# Patient Record
Sex: MALE | Race: BLACK OR AFRICAN AMERICAN | NOT HISPANIC OR LATINO | Employment: OTHER | URBAN - METROPOLITAN AREA
[De-identification: names, ages, dates, MRNs, and addresses within clinical notes are randomized per-mention and may not be internally consistent; named-entity substitution may affect disease eponyms.]

---

## 2024-03-05 ENCOUNTER — OFFICE VISIT (OUTPATIENT)
Dept: GASTROENTEROLOGY | Facility: HOSPITAL | Age: 64
End: 2024-03-05
Payer: COMMERCIAL

## 2024-03-05 VITALS
HEART RATE: 98 BPM | HEIGHT: 69 IN | DIASTOLIC BLOOD PRESSURE: 88 MMHG | SYSTOLIC BLOOD PRESSURE: 165 MMHG | BODY MASS INDEX: 21.03 KG/M2 | OXYGEN SATURATION: 98 % | WEIGHT: 142 LBS | RESPIRATION RATE: 18 BRPM | TEMPERATURE: 97.7 F

## 2024-03-05 DIAGNOSIS — R63.4 ABNORMAL WEIGHT LOSS: ICD-10-CM

## 2024-03-05 DIAGNOSIS — R10.13 EPIGASTRIC PAIN: Primary | ICD-10-CM

## 2024-03-05 DIAGNOSIS — R11.2 NAUSEA AND VOMITING, UNSPECIFIED VOMITING TYPE: ICD-10-CM

## 2024-03-05 DIAGNOSIS — R12 BURNING REFLUX: ICD-10-CM

## 2024-03-05 DIAGNOSIS — Q45.3 ABNORMALITY OF PANCREATIC DUCT: ICD-10-CM

## 2024-03-05 PROCEDURE — 1036F TOBACCO NON-USER: CPT | Performed by: NURSE PRACTITIONER

## 2024-03-05 PROCEDURE — 99204 OFFICE O/P NEW MOD 45 MIN: CPT | Performed by: NURSE PRACTITIONER

## 2024-03-05 PROCEDURE — 99214 OFFICE O/P EST MOD 30 MIN: CPT | Performed by: NURSE PRACTITIONER

## 2024-03-05 RX ORDER — TAMSULOSIN HYDROCHLORIDE 0.4 MG/1
0.4 CAPSULE ORAL
COMMUNITY
Start: 2024-03-03

## 2024-03-05 RX ORDER — OMEPRAZOLE 40 MG/1
40 CAPSULE, DELAYED RELEASE ORAL 2 TIMES DAILY
Qty: 60 CAPSULE | Refills: 11 | Status: SHIPPED | OUTPATIENT
Start: 2024-03-05 | End: 2025-03-05

## 2024-03-05 ASSESSMENT — PAIN SCALES - GENERAL: PAINLEVEL: 5

## 2024-03-05 NOTE — PROGRESS NOTES
Waylon TAVERAS is a 63 y.o. male with history of CAD and non-insulin-dependent diabetes who presents today for abdominal pain. He reports a 1-2 week history of epigastric abdominal pain, poor appetite, and indigestion. He has constant epigastric pain, nausea, and numerous episodes of vomiting. He now has odynophagia. He wants to eat but feels like he can't. The last few months he has lost 20-30 pounds.  He went to [facility] 3/2/2024 because the pain became unbearable and he had been unable to eat for a few days. CT scan showed mild prominence of the main pancreatic duct and intrapancreatic CBD which gradually tapered towards the ampulla. No radiopaque filling defect and no mass. His AST was slightly elevated at 55 but bili, alk phos, and ALT normal. Was not discharged on any medication.    The past year or 2 he has tended towards looser stools. It has progressed and he now has 5-10 loose stools per day. Imodium helps some. No rectal bleeding, hematochezia, or melena. He changed his diet but this did not help. He does not take NSAIDS regularly. No prior EGD or colonoscopy. Surgical history includes foot surgery.    Social history: No tobacco. Prior alcohol use. Denies illicit drugs.    Family history: Denies family history of ulcers, colon cancer, or other GI disorders or malignancy. Diabetes in the family.     History reviewed. No pertinent past medical history.    History reviewed. No pertinent surgical history.    Current Outpatient Medications   Medication Sig Dispense Refill    tamsulosin (Flomax) 0.4 mg 24 hr capsule Take 1 capsule (0.4 mg) by mouth. AT BEDTIME      omeprazole (PriLOSEC) 40 mg DR capsule Take 1 capsule (40 mg) by mouth 2 times a day. Do not crush or chew. 60 capsule 11     No current facility-administered medications for this visit.     No Known Allergies    No family history on file.    Review of Systems  Review of Systems negative except as noted in HPI.    Objective     /88   " Pulse 98   Temp 36.5 °C (97.7 °F)   Resp 18   Ht 1.753 m (5' 9\")   Wt 64.4 kg (142 lb)   SpO2 98%   BMI 20.97 kg/m²      Physical Exam  Constitutional:  No acute distress. Normal appearance. Chronically ill-appearing.  HENT:  Head normocephalic and atraumatic. Conjunctivae normal.  Cardiovascular:  Normal rate. Regular rhythm.  Pulmonary:  Pulmonary effort normal. No respiratory distress. Breath sounds clear.  Abdominal:  Abdomen is flat and soft. There is no distension. Epigastric tenderness with palpation. No rebound or guarding.  Skin: Dry.  Neurological:  Alert and oriented.  Psychiatric:  Mood and affect normal.    Assessment/Plan     63 y.o. male with history of CAD and non-insulin-dependent diabetes who presents today for initial clinic visit for several week history of epigastric pain with uncontrolled reflux, nausea, vomiting, and abnormal weight loss over 30 pounds. Recent CT at [facility] shows mild prominence of the main pancreatic duct and intrapancreatic CBD which gradually tapered towards the ampulla. No radiopaque filling defect and no mass. His AST was slightly elevated at 55 but bili, alk phos, and ALT normal. He needs endoscopy, but we discussed obtaining MRCP first to further evaluate pancreas to see if he needs EUS or ERCP as well.    He also reports high frequency loose stools the past 2 years. He has never had a colonoscopy and this should be performed at some point in time. It was discussed today but he does not currently think he could tolerate a bowel prep. We will investigate his current upper GI symptoms and defer this to a later date.    Recommendations  Start Omeprazole 40 mg twice daily.  Obtain MRCP. Once review of results will proceed with either EGD or EUS/ERCP if indicated.  He will follow up after procedure.    Electronically signed by: Raquel Gill CNP on 3/5/2024 at 1:44 PM      "

## 2024-03-05 NOTE — PATIENT INSTRUCTIONS
Recommendations  Start Omeprazole 1 cap before breakfast and 1 cap before dinner.  Schedule upper endoscopy. You can call [phone] to schedule.   Follow up after procedure. Please call the office at [phone] with any questions or concerns.